# Patient Record
Sex: MALE | Race: WHITE | NOT HISPANIC OR LATINO | Employment: FULL TIME | URBAN - METROPOLITAN AREA
[De-identification: names, ages, dates, MRNs, and addresses within clinical notes are randomized per-mention and may not be internally consistent; named-entity substitution may affect disease eponyms.]

---

## 2022-08-24 ENCOUNTER — HOSPITAL ENCOUNTER (EMERGENCY)
Facility: MEDICAL CENTER | Age: 37
End: 2022-08-24
Attending: EMERGENCY MEDICINE
Payer: COMMERCIAL

## 2022-08-24 VITALS
DIASTOLIC BLOOD PRESSURE: 70 MMHG | HEART RATE: 73 BPM | HEIGHT: 68 IN | SYSTOLIC BLOOD PRESSURE: 106 MMHG | OXYGEN SATURATION: 96 % | BODY MASS INDEX: 22.85 KG/M2 | RESPIRATION RATE: 16 BRPM | WEIGHT: 150.79 LBS | TEMPERATURE: 98.7 F

## 2022-08-24 DIAGNOSIS — M54.50 LUMBAR BACK PAIN: ICD-10-CM

## 2022-08-24 DIAGNOSIS — S39.012A STRAIN OF LUMBAR REGION, INITIAL ENCOUNTER: ICD-10-CM

## 2022-08-24 PROCEDURE — 700102 HCHG RX REV CODE 250 W/ 637 OVERRIDE(OP): Performed by: EMERGENCY MEDICINE

## 2022-08-24 PROCEDURE — 99283 EMERGENCY DEPT VISIT LOW MDM: CPT

## 2022-08-24 PROCEDURE — A9270 NON-COVERED ITEM OR SERVICE: HCPCS | Performed by: EMERGENCY MEDICINE

## 2022-08-24 PROCEDURE — 700111 HCHG RX REV CODE 636 W/ 250 OVERRIDE (IP): Performed by: EMERGENCY MEDICINE

## 2022-08-24 RX ORDER — OXYCODONE HYDROCHLORIDE AND ACETAMINOPHEN 5; 325 MG/1; MG/1
1 TABLET ORAL EVERY 4 HOURS PRN
Qty: 15 TABLET | Refills: 0 | Status: SHIPPED | OUTPATIENT
Start: 2022-08-24 | End: 2022-08-27

## 2022-08-24 RX ORDER — IBUPROFEN 200 MG
600 TABLET ORAL EVERY 6 HOURS PRN
Status: SHIPPED | COMMUNITY
End: 2022-09-13

## 2022-08-24 RX ORDER — OXYCODONE HYDROCHLORIDE AND ACETAMINOPHEN 5; 325 MG/1; MG/1
2 TABLET ORAL ONCE
Status: COMPLETED | OUTPATIENT
Start: 2022-08-24 | End: 2022-08-24

## 2022-08-24 RX ORDER — METHYLPREDNISOLONE 4 MG/1
TABLET ORAL
Qty: 1 EACH | Refills: 0 | Status: SHIPPED | OUTPATIENT
Start: 2022-08-24 | End: 2022-10-21

## 2022-08-24 RX ORDER — CYCLOBENZAPRINE HCL 10 MG
10 TABLET ORAL 3 TIMES DAILY PRN
Qty: 30 TABLET | Refills: 0 | Status: SHIPPED | OUTPATIENT
Start: 2022-08-24 | End: 2022-10-21

## 2022-08-24 RX ORDER — ONDANSETRON 4 MG/1
4 TABLET, ORALLY DISINTEGRATING ORAL ONCE
Status: COMPLETED | OUTPATIENT
Start: 2022-08-24 | End: 2022-08-24

## 2022-08-24 RX ORDER — METHYLPREDNISOLONE 4 MG/1
TABLET ORAL
Qty: 1 EACH | Refills: 0 | Status: SHIPPED | OUTPATIENT
Start: 2022-08-24 | End: 2024-02-21

## 2022-08-24 RX ORDER — DEXAMETHASONE 4 MG/1
8 TABLET ORAL ONCE
Status: COMPLETED | OUTPATIENT
Start: 2022-08-24 | End: 2022-08-24

## 2022-08-24 RX ADMIN — OXYCODONE AND ACETAMINOPHEN 2 TABLET: 5; 325 TABLET ORAL at 17:05

## 2022-08-24 RX ADMIN — ONDANSETRON 4 MG: 4 TABLET, ORALLY DISINTEGRATING ORAL at 17:06

## 2022-08-24 RX ADMIN — DEXAMETHASONE 8 MG: 4 TABLET ORAL at 17:06

## 2022-08-24 NOTE — ED PROVIDER NOTES
"CHIEF COMPLAINT  Chief Complaint   Patient presents with    Back Pain     Woke up yesterday with back pain, went to work and it exacerbated the pain, no identifiable cause. Hx of back, left low back.       HPI  Jorge Carr is a 37 y.o. male who presents for evaluation of acute low back pain.  The patient is accompanied by his wife.  He cannot recall any distinct lifting twisting or direct blunt penetrating trauma.  He does intermittently have acute low back pain.  He has never required surgery or physical therapy.  He reports dull aching bilateral low back pain.  It does not radiate down the thighs or buttock.  He has no neurological symptoms such as bowel or bladder incontinence or numbness or tingling or motor weakness.  He denies IV drug use fevers or chills.  Pain is worse with lifting and twisting.  No other complaints    REVIEW OF SYSTEMS  See HPI for further details.  No bowel or bladder incontinence fevers or chills all other systems are negative.     PAST MEDICAL HISTORY  Past Medical History:   Diagnosis Date    ACL (anterior cruciate ligament) tear     Allergy        FAMILY HISTORY  Noncontributory    SOCIAL HISTORY  Social History     Socioeconomic History    Marital status: Single   Tobacco Use    Smoking status: Never   Substance and Sexual Activity    Alcohol use: No    Drug use: No     No IV drugs  SURGICAL HISTORY  No past surgical history on file.  No major surgeries  CURRENT MEDICATIONS  Home Medications       Reviewed by Marcella Nino (Pharmacy Tech) on 08/24/22 at 1645  Med List Status: Complete     Medication Last Dose Status   ibuprofen (MOTRIN) 200 MG Tab 8/24/2022 Active                    ALLERGIES  No Known Allergies    PHYSICAL EXAM  VITAL SIGNS: /70   Pulse 73   Temp 37.1 °C (98.7 °F) (Temporal)   Resp 16   Ht 1.727 m (5' 8\")   Wt 68.4 kg (150 lb 12.7 oz)   SpO2 96%   BMI 22.93 kg/m²       Constitutional: Patient appears uncomfortable    HENT: Normocephalic, " Atraumatic, Bilateral external ears normal, Oropharynx moist, No oral exudates, Nose normal.   Eyes: PERRLA, EOMI, Conjunctiva normal, No discharge.   Neck: Normal range of motion, No tenderness, Supple, No stridor.   Cardiovascular: Normal heart rate, Normal rhythm, No murmurs, No rubs, No gallops.   Thorax & Lungs: Normal breath sounds, No respiratory distress, No wheezing, No chest tenderness.   Abdomen: Bowel sounds normal, Soft, No tenderness, No masses, No pulsatile masses.   Skin: Warm, Dry, No erythema, No rash.   Back: No midline thoracolumbar bony tenderness there is significant paraspinal spasm noted.  No CVA tenderness.   Genitalia: Groin sensation is normal  Extremities: Intact distal pulses, No edema, No tenderness, No cyanosis, No clubbing.   Neurologic: Bilateral straight leg test is negative alert & oriented x 3, Normal motor function, Normal sensory function, No focal deficits noted.   Psychiatric: Affect normal, Judgment normal, Mood normal.         COURSE & MEDICAL DECISION MAKING  Pertinent Labs & Imaging studies reviewed. (See chart for details)  Patient presents here with nontraumatic acute low back pain.  He has no red flags or trauma to suggest need for emergent imaging.  Specifically there is no midline bony tenderness noted, no history of trauma.  He has no neurological symptoms such as numbness weakness tingling or incontinence.  He has no prior history of back surgery or IV drug use or subjective fevers.  I counseled the patient I did not feel that emergent imaging is indicated.  He was given oral steroids as well as some oral opioid therapy.  I counseled him that he may ultimately need outpatient MRI and/or PT OT if symptoms continue.  We will prescribe a short course of opioids.  Her reviewed his profile in the  website and there is no history of opioid abuse or addiction.  Patient will be sent home with a Medrol Dosepak and Flexeril as well.  Return precautions were  reviewed    FINAL IMPRESSION  1.   1. Lumbar back pain  oxyCODONE-acetaminophen (PERCOCET) 5-325 MG Tab    oxyCODONE-acetaminophen (PERCOCET) 5-325 MG Tab      2. Strain of lumbar region, initial encounter  methylPREDNISolone (MEDROL DOSEPAK) 4 MG Tablet Therapy Pack    cyclobenzaprine (FLEXERIL) 10 mg Tab    oxyCODONE-acetaminophen (PERCOCET) 5-325 MG Tab    methylPREDNISolone (MEDROL DOSEPAK) 4 MG Tablet Therapy Pack    cyclobenzaprine (FLEXERIL) 10 mg Tab                 Electronically signed by: Hi Bhatti M.D., 8/24/2022 6:59 PM      Electronically signed by: Hi Bhatti M.D., 8/24/2022 5:39 PM

## 2022-08-24 NOTE — ED NOTES
Med rec updated and complete, per pt   Allergies reviewed, per pt   Interviewed pt with girlfriend at bedside with permission from pt.  Pt reports no prescription medications or vitamins.  Pt reports no antibiotics in the last 30 days.

## 2022-08-24 NOTE — ED TRIAGE NOTES
Chief Complaint   Patient presents with    Back Pain     Woke up yesterday with back pain, went to work and it exacerbated the pain, no identifiable cause. Hx of back, left low back.

## 2022-08-25 NOTE — ED NOTES
Discharge instructions provided.  Pt verbalized the understanding of discharge instructions to follow up with PCP and to return to ER if condition worsens.  Pt ambulated out of ER without difficulty.  Controlled substance informed consent has been reviewed with patient and necessary signatures have been procured.

## 2022-09-12 ENCOUNTER — TELEPHONE (OUTPATIENT)
Dept: SCHEDULING | Facility: IMAGING CENTER | Age: 37
End: 2022-09-12

## 2022-09-13 ENCOUNTER — OFFICE VISIT (OUTPATIENT)
Dept: MEDICAL GROUP | Facility: MEDICAL CENTER | Age: 37
End: 2022-09-13
Payer: COMMERCIAL

## 2022-09-13 VITALS
TEMPERATURE: 98.1 F | SYSTOLIC BLOOD PRESSURE: 118 MMHG | DIASTOLIC BLOOD PRESSURE: 80 MMHG | BODY MASS INDEX: 21.48 KG/M2 | WEIGHT: 145 LBS | HEIGHT: 69 IN | HEART RATE: 98 BPM | RESPIRATION RATE: 16 BRPM | OXYGEN SATURATION: 99 %

## 2022-09-13 DIAGNOSIS — M54.50 ACUTE BILATERAL LOW BACK PAIN WITHOUT SCIATICA: ICD-10-CM

## 2022-09-13 DIAGNOSIS — F33.41 RECURRENT MAJOR DEPRESSIVE DISORDER, IN PARTIAL REMISSION (HCC): ICD-10-CM

## 2022-09-13 DIAGNOSIS — Z00.00 HEALTH CARE MAINTENANCE: ICD-10-CM

## 2022-09-13 PROCEDURE — 99204 OFFICE O/P NEW MOD 45 MIN: CPT | Performed by: STUDENT IN AN ORGANIZED HEALTH CARE EDUCATION/TRAINING PROGRAM

## 2022-09-13 RX ORDER — FLUOXETINE HYDROCHLORIDE 20 MG/1
20 CAPSULE ORAL DAILY
Qty: 60 CAPSULE | Refills: 0 | Status: SHIPPED | OUTPATIENT
Start: 2022-09-13 | End: 2022-10-21

## 2022-09-13 RX ORDER — MELOXICAM 7.5 MG/1
7.5 TABLET ORAL DAILY
Qty: 60 TABLET | Refills: 0 | Status: SHIPPED | OUTPATIENT
Start: 2022-09-13 | End: 2024-02-21

## 2022-09-13 ASSESSMENT — PATIENT HEALTH QUESTIONNAIRE - PHQ9
CLINICAL INTERPRETATION OF PHQ2 SCORE: 5
5. POOR APPETITE OR OVEREATING: 2 - MORE THAN HALF THE DAYS
SUM OF ALL RESPONSES TO PHQ QUESTIONS 1-9: 17

## 2022-09-13 ASSESSMENT — ENCOUNTER SYMPTOMS
SHORTNESS OF BREATH: 0
DEPRESSION: 1
HALLUCINATIONS: 0
FEVER: 0
BACK PAIN: 1
CHILLS: 0

## 2022-09-13 NOTE — PROGRESS NOTES
"Subjective:     CC:  Diagnoses of Acute bilateral low back pain without sciatica, Health care maintenance, and Recurrent major depressive disorder, in partial remission (HCC) were pertinent to this visit.    HISTORY OF THE PRESENT ILLNESS: Patient is a 37 y.o. male with the following medical problems   Past Medical History:   Diagnosis Date    ACL (anterior cruciate ligament) tear     Allergy      . This pleasant patient is here today to establish care and discuss the following;    Problem   Acute Bilateral Low Back Pain Without Sciatica    Patient continues to have back pain despite receiving flexiril and medrol dose back in the ER on 8/24/22. ER HPI listed below    \" 37 y.o. male who presents for evaluation of acute low back pain.  The patient is accompanied by his wife.  He cannot recall any distinct lifting twisting or direct blunt penetrating trauma.  He does intermittently have acute low back pain.  He has never required surgery or physical therapy.  He reports dull aching bilateral low back pain.  It does not radiate down the thighs or buttock.  He has no neurological symptoms such as bowel or bladder incontinence or numbness or tingling or motor weakness.  He denies IV drug use fevers or chills.  Pain is worse with lifting and twisting.  No other complaints\"    Interval hx  -patient continues have bilateral back pain  -he denies sciatica   -flexeril and medrol dose pack did help  -at work he does a lot of twisting and bending  -after 5 days he returned back to work, and within 6 days he started have pain again   -he has visited a chiropractor last week and had an xrays completed. He will be visiting again in 2 days to learn of the results   -he denies any urinary/bowel incontinence, fevers, saddle anesthesia, or focal weakness      Recurrent Major Depressive Disorder, in Partial Remission (Hcc)    -started 3-4 years ago   -no inciting events   -denies a planned suicide, denies homicidal thoughts      " "      Current Outpatient Medications Ordered in Epic   Medication Sig Dispense Refill    meloxicam (MOBIC) 7.5 MG Tab Take 1 Tablet by mouth every day. 60 Tablet 0    FLUoxetine (PROZAC) 20 MG Cap Take 1 Capsule by mouth every day. 60 Capsule 0    methylPREDNISolone (MEDROL DOSEPAK) 4 MG Tablet Therapy Pack Use as directed 1 Each 0    cyclobenzaprine (FLEXERIL) 10 mg Tab Take 1 Tablet by mouth 3 times a day as needed for Mild Pain. 30 Tablet 0    methylPREDNISolone (MEDROL DOSEPAK) 4 MG Tablet Therapy Pack Use as directed 1 Each 0    cyclobenzaprine (FLEXERIL) 10 mg Tab Take 1 Tablet by mouth 3 times a day as needed for Moderate Pain. 30 Tablet 0     No current Epic-ordered facility-administered medications on file.         ROS:   Review of Systems   Constitutional:  Negative for chills and fever.   Respiratory:  Negative for shortness of breath.    Cardiovascular:  Negative for chest pain.   Musculoskeletal:  Positive for back pain.   Psychiatric/Behavioral:  Positive for depression. Negative for hallucinations and suicidal ideas.        Objective:     Exam: /80 (BP Location: Left arm, Patient Position: Sitting, BP Cuff Size: Adult)   Pulse 98   Temp 36.7 °C (98.1 °F) (Temporal)   Resp 16   Ht 1.753 m (5' 9\")   Wt 65.8 kg (145 lb)   SpO2 99%  Body mass index is 21.41 kg/m².    Physical Exam  Constitutional:       General: He is not in acute distress.     Appearance: He is not ill-appearing.   Pulmonary:      Effort: Pulmonary effort is normal.   Musculoskeletal:         General: No deformity.   Neurological:      Mental Status: He is alert.   Psychiatric:         Mood and Affect: Mood normal.         Behavior: Behavior normal.         Thought Content: Thought content normal.         Judgment: Judgment normal.             Assessment & Plan:     Problem List Items Addressed This Visit       Acute bilateral low back pain without sciatica     Acute  -likely MSK in nature  -will provide meloxicam for pain   "       Relevant Medications    meloxicam (MOBIC) 7.5 MG Tab    Recurrent major depressive disorder, in partial remission (HCC)     Chronic  -refer to psychology  -start with prozac          Relevant Medications    FLUoxetine (PROZAC) 20 MG Cap    Other Relevant Orders    Referral to Behavioral Health     Other Visit Diagnoses       Health care maintenance        Relevant Orders    HEMOGLOBIN A1C    CBC WITH DIFFERENTIAL    Comp Metabolic Panel    TSH WITH REFLEX TO FT4    Lipid Profile                Return in about 2 weeks (around 9/27/2022) for Labs and mood .    Please note that this dictation was created using voice recognition software. I have made every reasonable attempt to correct obvious errors, but I expect that there are errors of grammar and possibly content that I did not discover before finalizing the note.

## 2022-09-13 NOTE — LETTER
September 13, 2022      To whom this applies,    Due to a medical condition Jorge Carr is recommended to return back to work on 9/20/22.                        Greg Alfred D.O.

## 2022-09-21 ENCOUNTER — HOSPITAL ENCOUNTER (OUTPATIENT)
Dept: LAB | Facility: MEDICAL CENTER | Age: 37
End: 2022-09-21
Attending: STUDENT IN AN ORGANIZED HEALTH CARE EDUCATION/TRAINING PROGRAM
Payer: COMMERCIAL

## 2022-09-21 DIAGNOSIS — Z00.00 HEALTH CARE MAINTENANCE: ICD-10-CM

## 2022-09-21 LAB
ALBUMIN SERPL BCP-MCNC: 5 G/DL (ref 3.2–4.9)
ALBUMIN/GLOB SERPL: 1.8 G/DL
ALP SERPL-CCNC: 41 U/L (ref 30–99)
ALT SERPL-CCNC: 10 U/L (ref 2–50)
ANION GAP SERPL CALC-SCNC: 12 MMOL/L (ref 7–16)
AST SERPL-CCNC: 16 U/L (ref 12–45)
BASOPHILS # BLD AUTO: 0.4 % (ref 0–1.8)
BASOPHILS # BLD: 0.03 K/UL (ref 0–0.12)
BILIRUB SERPL-MCNC: 0.5 MG/DL (ref 0.1–1.5)
BUN SERPL-MCNC: 23 MG/DL (ref 8–22)
CALCIUM SERPL-MCNC: 10.1 MG/DL (ref 8.5–10.5)
CHLORIDE SERPL-SCNC: 101 MMOL/L (ref 96–112)
CHOLEST SERPL-MCNC: 171 MG/DL (ref 100–199)
CO2 SERPL-SCNC: 28 MMOL/L (ref 20–33)
CREAT SERPL-MCNC: 0.91 MG/DL (ref 0.5–1.4)
EOSINOPHIL # BLD AUTO: 0.24 K/UL (ref 0–0.51)
EOSINOPHIL NFR BLD: 3.2 % (ref 0–6.9)
ERYTHROCYTE [DISTWIDTH] IN BLOOD BY AUTOMATED COUNT: 38.1 FL (ref 35.9–50)
EST. AVERAGE GLUCOSE BLD GHB EST-MCNC: 111 MG/DL
FASTING STATUS PATIENT QL REPORTED: NORMAL
GFR SERPLBLD CREATININE-BSD FMLA CKD-EPI: 111 ML/MIN/1.73 M 2
GLOBULIN SER CALC-MCNC: 2.8 G/DL (ref 1.9–3.5)
GLUCOSE SERPL-MCNC: 103 MG/DL (ref 65–99)
HBA1C MFR BLD: 5.5 % (ref 4–5.6)
HCT VFR BLD AUTO: 46.7 % (ref 42–52)
HDLC SERPL-MCNC: 56 MG/DL
HGB BLD-MCNC: 16 G/DL (ref 14–18)
IMM GRANULOCYTES # BLD AUTO: 0.02 K/UL (ref 0–0.11)
IMM GRANULOCYTES NFR BLD AUTO: 0.3 % (ref 0–0.9)
LDLC SERPL CALC-MCNC: 103 MG/DL
LYMPHOCYTES # BLD AUTO: 2.61 K/UL (ref 1–4.8)
LYMPHOCYTES NFR BLD: 35.2 % (ref 22–41)
MCH RBC QN AUTO: 30.9 PG (ref 27–33)
MCHC RBC AUTO-ENTMCNC: 34.3 G/DL (ref 33.7–35.3)
MCV RBC AUTO: 90.3 FL (ref 81.4–97.8)
MONOCYTES # BLD AUTO: 0.77 K/UL (ref 0–0.85)
MONOCYTES NFR BLD AUTO: 10.4 % (ref 0–13.4)
NEUTROPHILS # BLD AUTO: 3.75 K/UL (ref 1.82–7.42)
NEUTROPHILS NFR BLD: 50.5 % (ref 44–72)
NRBC # BLD AUTO: 0 K/UL
NRBC BLD-RTO: 0 /100 WBC
PLATELET # BLD AUTO: 260 K/UL (ref 164–446)
PMV BLD AUTO: 9.8 FL (ref 9–12.9)
POTASSIUM SERPL-SCNC: 4.4 MMOL/L (ref 3.6–5.5)
PROT SERPL-MCNC: 7.8 G/DL (ref 6–8.2)
RBC # BLD AUTO: 5.17 M/UL (ref 4.7–6.1)
SODIUM SERPL-SCNC: 141 MMOL/L (ref 135–145)
TRIGL SERPL-MCNC: 59 MG/DL (ref 0–149)
TSH SERPL DL<=0.005 MIU/L-ACNC: 0.73 UIU/ML (ref 0.38–5.33)
WBC # BLD AUTO: 7.4 K/UL (ref 4.8–10.8)

## 2022-09-21 PROCEDURE — 80061 LIPID PANEL: CPT

## 2022-09-21 PROCEDURE — 83036 HEMOGLOBIN GLYCOSYLATED A1C: CPT

## 2022-09-21 PROCEDURE — 85025 COMPLETE CBC W/AUTO DIFF WBC: CPT

## 2022-09-21 PROCEDURE — 36415 COLL VENOUS BLD VENIPUNCTURE: CPT

## 2022-09-21 PROCEDURE — 80053 COMPREHEN METABOLIC PANEL: CPT

## 2022-09-21 PROCEDURE — 84443 ASSAY THYROID STIM HORMONE: CPT

## 2022-09-26 ENCOUNTER — OFFICE VISIT (OUTPATIENT)
Dept: MEDICAL GROUP | Facility: MEDICAL CENTER | Age: 37
End: 2022-09-26
Payer: COMMERCIAL

## 2022-09-26 VITALS
BODY MASS INDEX: 21.03 KG/M2 | WEIGHT: 142 LBS | HEIGHT: 69 IN | HEART RATE: 85 BPM | OXYGEN SATURATION: 98 % | TEMPERATURE: 97.1 F | SYSTOLIC BLOOD PRESSURE: 104 MMHG | DIASTOLIC BLOOD PRESSURE: 70 MMHG

## 2022-09-26 DIAGNOSIS — M54.50 ACUTE BILATERAL LOW BACK PAIN WITHOUT SCIATICA: ICD-10-CM

## 2022-09-26 DIAGNOSIS — F33.41 RECURRENT MAJOR DEPRESSIVE DISORDER, IN PARTIAL REMISSION (HCC): ICD-10-CM

## 2022-09-26 PROCEDURE — 99214 OFFICE O/P EST MOD 30 MIN: CPT | Performed by: STUDENT IN AN ORGANIZED HEALTH CARE EDUCATION/TRAINING PROGRAM

## 2022-09-26 ASSESSMENT — ENCOUNTER SYMPTOMS
CHILLS: 0
SHORTNESS OF BREATH: 0
FEVER: 0

## 2022-09-26 ASSESSMENT — FIBROSIS 4 INDEX: FIB4 SCORE: 0.72

## 2022-09-26 NOTE — ASSESSMENT & PLAN NOTE
Acute-resolved  -I discussed with patient to retrain his core and stretch to prevent further episodes in the future

## 2022-09-26 NOTE — ASSESSMENT & PLAN NOTE
Chronic  -continue prozac 20 mg for now  -continue to follow with therapy    -follow up in 1 month to make any adjustments to prozac

## 2022-09-26 NOTE — PROGRESS NOTES
"Subjective:     CC: Labs     HPI:   Jorge presents today for the following;    Problem   Acute Bilateral Low Back Pain Without Sciatica    Patient continues to have back pain despite receiving flexiril and medrol dose back in the ER on 8/24/22. ER HPI listed below    \" 37 y.o. male who presents for evaluation of acute low back pain.  The patient is accompanied by his wife.  He cannot recall any distinct lifting twisting or direct blunt penetrating trauma.  He does intermittently have acute low back pain.  He has never required surgery or physical therapy.  He reports dull aching bilateral low back pain.  It does not radiate down the thighs or buttock.  He has no neurological symptoms such as bowel or bladder incontinence or numbness or tingling or motor weakness.  He denies IV drug use fevers or chills.  Pain is worse with lifting and twisting.  No other complaints\"    Interval hx  -patient continues have bilateral back pain  -he denies sciatica   -flexeril and medrol dose pack did help  -at work he does a lot of twisting and bending  -after 5 days he returned back to work, and within 6 days he started have pain again   -he has visited a chiropractor last week and had an xrays completed. He will be visiting again in 2 days to learn of the results   -he denies any urinary/bowel incontinence, fevers, saddle anesthesia, or focal weakness     Interval hx 9/26/22  -patient reports his pain has resolved      Recurrent Major Depressive Disorder, in Partial Remission (Hcc)    Initial hx 9/13/22:  -started 3-4 years ago   -no inciting events   -denies a planned suicide, denies homicidal thoughts     Interval hx 9/26/22  -previously patient was started on prozac 20mg, he has noticed much change yet   -he denies any SI/ HI  -he is following with therapy on a weekly basis            Current Outpatient Medications Ordered in Epic   Medication Sig Dispense Refill    meloxicam (MOBIC) 7.5 MG Tab Take 1 Tablet by mouth every day. " "60 Tablet 0    FLUoxetine (PROZAC) 20 MG Cap Take 1 Capsule by mouth every day. 60 Capsule 0    methylPREDNISolone (MEDROL DOSEPAK) 4 MG Tablet Therapy Pack Use as directed 1 Each 0    cyclobenzaprine (FLEXERIL) 10 mg Tab Take 1 Tablet by mouth 3 times a day as needed for Mild Pain. 30 Tablet 0    methylPREDNISolone (MEDROL DOSEPAK) 4 MG Tablet Therapy Pack Use as directed 1 Each 0    cyclobenzaprine (FLEXERIL) 10 mg Tab Take 1 Tablet by mouth 3 times a day as needed for Moderate Pain. 30 Tablet 0     No current Epic-ordered facility-administered medications on file.           ROS:  Review of Systems   Constitutional:  Negative for chills and fever.   Respiratory:  Negative for shortness of breath.    Cardiovascular:  Negative for chest pain.   Psychiatric/Behavioral:  Negative for suicidal ideas.      Objective:     Exam:  /70   Pulse 85   Temp 36.2 °C (97.1 °F) (Temporal)   Ht 1.753 m (5' 9\")   Wt 64.4 kg (142 lb)   SpO2 98%   BMI 20.97 kg/m²  Body mass index is 20.97 kg/m².    Physical Exam  Constitutional:       General: He is not in acute distress.     Appearance: He is not ill-appearing.   Cardiovascular:      Rate and Rhythm: Normal rate and regular rhythm.      Pulses: Normal pulses.   Pulmonary:      Effort: Pulmonary effort is normal. No respiratory distress.      Breath sounds: No wheezing.   Neurological:      Mental Status: He is alert.   Psychiatric:         Mood and Affect: Mood normal.         Behavior: Behavior normal.         Thought Content: Thought content normal.         Judgment: Judgment normal.             Assessment & Plan:     Problem List Items Addressed This Visit       Acute bilateral low back pain without sciatica     Acute-resolved  -I discussed with patient to retrain his core and stretch to prevent further episodes in the future          Recurrent major depressive disorder, in partial remission (HCC)     Chronic  -continue prozac 20 mg for now  -continue to follow with " therapy    -follow up in 1 month to make any adjustments to prozac               3+ labs reviewed     Please note that this dictation was created using voice recognition software. I have made every reasonable attempt to correct obvious errors, but I expect that there are errors of grammar and possibly content that I did not discover before finalizing the note.

## 2022-10-21 ENCOUNTER — TELEMEDICINE (OUTPATIENT)
Dept: MEDICAL GROUP | Facility: MEDICAL CENTER | Age: 37
End: 2022-10-21
Payer: COMMERCIAL

## 2022-10-21 DIAGNOSIS — M54.50 ACUTE BILATERAL LOW BACK PAIN WITHOUT SCIATICA: ICD-10-CM

## 2022-10-21 DIAGNOSIS — F33.41 RECURRENT MAJOR DEPRESSIVE DISORDER, IN PARTIAL REMISSION (HCC): ICD-10-CM

## 2022-10-21 PROCEDURE — 99213 OFFICE O/P EST LOW 20 MIN: CPT | Mod: 95 | Performed by: STUDENT IN AN ORGANIZED HEALTH CARE EDUCATION/TRAINING PROGRAM

## 2022-10-21 RX ORDER — BACLOFEN 10 MG/1
10 TABLET ORAL 3 TIMES DAILY PRN
Qty: 60 TABLET | Refills: 0 | Status: SHIPPED | OUTPATIENT
Start: 2022-10-21 | End: 2024-02-21

## 2022-10-21 RX ORDER — FLUOXETINE HYDROCHLORIDE 40 MG/1
40 CAPSULE ORAL DAILY
Qty: 60 CAPSULE | Refills: 0 | Status: SHIPPED | OUTPATIENT
Start: 2022-10-21 | End: 2022-12-20

## 2022-10-21 ASSESSMENT — ENCOUNTER SYMPTOMS
FEVER: 0
BACK PAIN: 1
FOCAL WEAKNESS: 0
WEAKNESS: 0
DEPRESSION: 1
SENSORY CHANGE: 0
TINGLING: 0

## 2022-10-21 NOTE — ASSESSMENT & PLAN NOTE
Acute  -patient pain has continued for over 6 weeks despite conservative therapy  -MRI lumbar spine ordered   -may refer to sports medicine

## 2022-10-21 NOTE — PROGRESS NOTES
"Subjective:   This evaluation was conducted via Zoom using secure and encrypted videoconferencing technology. The patient was in their home in the [facility].    The patient's identity was confirmed and verbal consent was obtained for this virtual visit.   CC: Medications follow up    HPI:   Jorge presents today for the following;    Problem   Acute Bilateral Low Back Pain Without Sciatica    Patient continues to have back pain despite receiving flexiril and medrol dose back in the ER on 8/24/22. ER HPI listed below    \" 37 y.o. male who presents for evaluation of acute low back pain.  The patient is accompanied by his wife.  He cannot recall any distinct lifting twisting or direct blunt penetrating trauma.  He does intermittently have acute low back pain.  He has never required surgery or physical therapy.  He reports dull aching bilateral low back pain.  It does not radiate down the thighs or buttock.  He has no neurological symptoms such as bowel or bladder incontinence or numbness or tingling or motor weakness.  He denies IV drug use fevers or chills.  Pain is worse with lifting and twisting.  No other complaints\"    Interval hx  -patient continues have bilateral back pain  -he denies sciatica   -flexeril and medrol dose pack did help  -at work he does a lot of twisting and bending  -after 5 days he returned back to work, and within 6 days he started have pain again   -he has visited a chiropractor last week and had an xrays completed. He will be visiting again in 2 days to learn of the results   -he denies any urinary/bowel incontinence, fevers, saddle anesthesia, or focal weakness     Interval hx 9/26/22  -patient reports his pain has resolved     Interval hx 10/21/22  -pain has returned he reports it is worse   -currently his dull pain radiates across his entire lower back, leaning forward initiates a sharp pain     Recurrent Major Depressive Disorder, in Partial Remission (Hcc)    Initial hx " 9/13/22:  -started 3-4 years ago   -no inciting events   -denies a planned suicide, denies homicidal thoughts     Interval hx 9/26/22  -previously patient was started on prozac 20mg, he has noticed much change yet   -he denies any SI/ HI  -he is following with therapy on a weekly basis     Interval hx 10/21/22  -patient has not noticed much difference with prozac 20mg   -he denies SI/HI           Current Outpatient Medications Ordered in Epic   Medication Sig Dispense Refill    fluoxetine (PROZAC) 40 MG capsule Take 1 Capsule by mouth every day for 60 days. 60 Capsule 0    baclofen (LIORESAL) 10 MG Tab Take 1 Tablet by mouth 3 times a day as needed (back pain). 60 Tablet 0    meloxicam (MOBIC) 7.5 MG Tab Take 1 Tablet by mouth every day. 60 Tablet 0    methylPREDNISolone (MEDROL DOSEPAK) 4 MG Tablet Therapy Pack Use as directed 1 Each 0     No current [facility]-ordered facility-administered medications on file.           ROS:  Review of Systems   Constitutional:  Negative for fever.   Musculoskeletal:  Positive for back pain.   Neurological:  Negative for tingling, sensory change, focal weakness and weakness.   Psychiatric/Behavioral:  Positive for depression. Negative for suicidal ideas.      Objective:     Exam:  There were no vitals taken for this visit. There is no height or weight on file to calculate BMI.    Physical Exam  Constitutional:       General: He is not in acute distress.     Appearance: He is not ill-appearing.   Pulmonary:      Effort: Pulmonary effort is normal.   Neurological:      Mental Status: He is alert.   Psychiatric:         Mood and Affect: Mood normal.         Behavior: Behavior normal.         Thought Content: Thought content normal.         Judgment: Judgment normal.             Assessment & Plan:     Problem List Items Addressed This Visit       Acute bilateral low back pain without sciatica     Acute  -patient pain has continued for over 6 weeks despite conservative therapy  -MRI lumbar  spine ordered   -may refer to sports medicine            Relevant Medications    baclofen (LIORESAL) 10 MG Tab    Other Relevant Orders    MR-LUMBAR SPINE-W/O    Referral to Physical Therapy    Recurrent major depressive disorder, in partial remission (HCC)     Chronic not adequately controlled   -increase prozac to 40mg         Relevant Medications    fluoxetine (PROZAC) 40 MG capsule           Return in about 6 weeks (around 12/2/2022) for F/U Meds and back pain.    Please note that this dictation was created using voice recognition software. I have made every reasonable attempt to correct obvious errors, but I expect that there are errors of grammar and possibly content that I did not discover before finalizing the note.

## 2022-11-07 ENCOUNTER — HOSPITAL ENCOUNTER (OUTPATIENT)
Dept: RADIOLOGY | Facility: MEDICAL CENTER | Age: 37
End: 2022-11-07
Attending: STUDENT IN AN ORGANIZED HEALTH CARE EDUCATION/TRAINING PROGRAM
Payer: COMMERCIAL

## 2022-11-07 DIAGNOSIS — M54.50 ACUTE BILATERAL LOW BACK PAIN WITHOUT SCIATICA: ICD-10-CM

## 2022-11-07 PROCEDURE — 72148 MRI LUMBAR SPINE W/O DYE: CPT

## 2024-02-21 ENCOUNTER — OFFICE VISIT (OUTPATIENT)
Dept: URGENT CARE | Facility: CLINIC | Age: 39
End: 2024-02-21
Payer: COMMERCIAL

## 2024-02-21 VITALS
TEMPERATURE: 97.6 F | DIASTOLIC BLOOD PRESSURE: 56 MMHG | HEART RATE: 83 BPM | BODY MASS INDEX: 24.34 KG/M2 | SYSTOLIC BLOOD PRESSURE: 102 MMHG | RESPIRATION RATE: 16 BRPM | OXYGEN SATURATION: 97 % | HEIGHT: 68 IN | WEIGHT: 160.6 LBS

## 2024-02-21 DIAGNOSIS — M51.36 DEGENERATIVE DISC DISEASE, LUMBAR: ICD-10-CM

## 2024-02-21 DIAGNOSIS — M54.40 BACK PAIN OF LUMBAR REGION WITH SCIATICA: ICD-10-CM

## 2024-02-21 PROCEDURE — 3074F SYST BP LT 130 MM HG: CPT | Performed by: REGISTERED NURSE

## 2024-02-21 PROCEDURE — 3078F DIAST BP <80 MM HG: CPT | Performed by: REGISTERED NURSE

## 2024-02-21 PROCEDURE — 99214 OFFICE O/P EST MOD 30 MIN: CPT | Performed by: REGISTERED NURSE

## 2024-02-21 RX ORDER — METHYLPREDNISOLONE 4 MG/1
TABLET ORAL
Qty: 21 TABLET | Refills: 0 | Status: SHIPPED | OUTPATIENT
Start: 2024-02-21

## 2024-02-21 RX ORDER — METHOCARBAMOL 750 MG/1
1500 TABLET, FILM COATED ORAL 3 TIMES DAILY
Qty: 30 TABLET | Refills: 0 | Status: SHIPPED | OUTPATIENT
Start: 2024-02-21 | End: 2024-02-26

## 2024-02-21 ASSESSMENT — ENCOUNTER SYMPTOMS
CHILLS: 0
VOMITING: 0
HEADACHES: 0
FEVER: 0
SHORTNESS OF BREATH: 0
DIZZINESS: 0

## 2024-02-21 ASSESSMENT — FIBROSIS 4 INDEX: FIB4 SCORE: 0.74

## 2024-02-21 NOTE — LETTER
February 21, 2024         Patient: Jorge Carr   YOB: 1985   Date of Visit: 2/21/2024           To Whom it May Concern:    Jorge Carr was seen in my clinic on 2/21/2024. He may return to work on 02/25/24.    If you have any questions or concerns, please don't hesitate to call.        Sincerely,           VIPIN Cleary  Electronically Signed

## 2024-02-21 NOTE — PROGRESS NOTES
Subjective:   Jorge Carr is a 38 y.o. male who presents for Back Pain (X3 days, hx of ruptured disc in lumbar area x1 year)      HPI  Hx of degenerative disc disease, diagnosed with bulging disc in L5-S1 11/07/2022. Since then has had intermittent pain since this diagnose. Was at work 3 days ago and started to notive his lumbar back flaring up where he has had previous issues. There is radiation down the right leg. Started 20mg of prednisone q12 since yesterday.     Denies fever, trauma, recent surgery, new onset numbness tingling or weakness in lower extremities, saddle paresthesia, incontinence, urinary retention, IV drug use, unexplained weight loss.     Review of Systems   Constitutional:  Negative for chills and fever.   Respiratory:  Negative for shortness of breath.    Cardiovascular:  Negative for chest pain.   Gastrointestinal:  Negative for vomiting.   Neurological:  Negative for dizziness and headaches.       Allergies   Allergen Reactions    Pollen Extract        Patient Active Problem List    Diagnosis Date Noted    Acute bilateral low back pain without sciatica 09/13/2022    Recurrent major depressive disorder, in partial remission (HCC) 09/13/2022       Current Outpatient Medications Ordered in Epic   Medication Sig Dispense Refill    methylPREDNISolone (MEDROL DOSEPAK) 4 MG Tablet Therapy Pack Follow schedule on package instructions. 21 Tablet 0    methocarbamol (ROBAXIN) 750 MG Tab Take 2 Tablets by mouth 3 times a day for 5 days. 30 Tablet 0     No current Epic-ordered facility-administered medications on file.       No past surgical history on file.    Social History     Tobacco Use    Smoking status: Never    Smokeless tobacco: Former     Quit date: 2015   Vaping Use    Vaping Use: Never used   Substance Use Topics    Alcohol use: Never    Drug use: Yes     Types: Marijuana       family history includes No Known Problems in his mother; Prostate cancer in his father.     Problem list,  "medications, and allergies reviewed by myself today in Epic.     Objective:   /56 (BP Location: Right arm, Patient Position: Sitting, BP Cuff Size: Adult)   Pulse 83   Temp 36.4 °C (97.6 °F)   Resp 16   Ht 1.727 m (5' 8\")   Wt 72.8 kg (160 lb 9.6 oz)   SpO2 97%   BMI 24.42 kg/m²     Physical Exam  Vitals and nursing note reviewed.   Constitutional:       Appearance: Normal appearance. He is not ill-appearing or toxic-appearing.   HENT:      Mouth/Throat:      Mouth: Mucous membranes are moist.   Eyes:      Pupils: Pupils are equal, round, and reactive to light.   Cardiovascular:      Rate and Rhythm: Normal rate and regular rhythm.      Heart sounds: No murmur heard.  Pulmonary:      Effort: Pulmonary effort is normal. No respiratory distress.      Breath sounds: Normal breath sounds.   Abdominal:      General: Abdomen is flat.      Palpations: Abdomen is soft.   Musculoskeletal:      Cervical back: Normal and normal range of motion.      Thoracic back: Normal.      Lumbar back: Spasms present. No bony tenderness. Decreased range of motion. Negative right straight leg raise test and negative left straight leg raise test.        Back:    Skin:     General: Skin is warm and dry.      Capillary Refill: Capillary refill takes less than 2 seconds.      Findings: No rash.   Neurological:      General: No focal deficit present.      Mental Status: He is alert and oriented to person, place, and time.      Cranial Nerves: Cranial nerves 2-12 are intact. No cranial nerve deficit.      Sensory: Sensation is intact.      Motor: Motor function is intact.      Coordination: Coordination is intact.      Gait: Gait abnormal (antalgic).      Deep Tendon Reflexes:      Reflex Scores:       Patellar reflexes are 3+ on the right side and 2+ on the left side.     Comments: Slight increased reactivity in right patellar DTRs. Neurovascular intact distally x 4.   Psychiatric:         Mood and Affect: Mood normal. "         Assessment/Plan:     1. Back pain of lumbar region with sciatica  methylPREDNISolone (MEDROL DOSEPAK) 4 MG Tablet Therapy Pack    methocarbamol (ROBAXIN) 750 MG Tab    Referral to Neurosurgery      2. Degenerative disc disease, lumbar  methylPREDNISolone (MEDROL DOSEPAK) 4 MG Tablet Therapy Pack    methocarbamol (ROBAXIN) 750 MG Tab    Referral to Neurosurgery        History of degenerative disc disease, MRI confirming L5-S1 diffuse disc bulge.  At that time was told no treatment options.  Has had intermittent flaring over the past 1 to 2 years.  No red flag signs or symptoms.  His degenerative disc disease is currently in exacerbation.  Thankfully his vital signs are reassuring.  On exam he does have some tenderness in the lumbar region no bony tenderness.  Neurovascular intact distally and equal x 4.  The right patellar DTR is slightly more active than the left, strength is intact as well as sensation as previously mentioned.  Had long discussion with patient about his symptoms and my concerns.  We will discharge him home on Medrol Dosepak as well as Robaxin with urgent referral to neurosurgery.  Had long discussion about signs and symptoms that require immediate attention in the ER and the patient verbalized understanding.  Work note is given.    Differential diagnosis, natural history, and supportive care discussed. We also reviewed side effects of medication including allergic response, GI upset, tendon injury, rash, sedation etc. Patient and/or guardian voices understanding.      Advised the patient to follow-up with the primary care physician for recheck, reevaluation, and consideration of further management.    Please note that this dictation was created using voice recognition software. I have made every reasonable attempt to correct obvious errors, but I expect that there are errors of grammar and possibly content that I did not discover before finalizing the note.    This note was electronically  signed by VIPIN Cleary